# Patient Record
Sex: FEMALE | Race: WHITE | NOT HISPANIC OR LATINO | Employment: UNEMPLOYED | URBAN - NONMETROPOLITAN AREA
[De-identification: names, ages, dates, MRNs, and addresses within clinical notes are randomized per-mention and may not be internally consistent; named-entity substitution may affect disease eponyms.]

---

## 2022-09-14 PROBLEM — E66.811 OBESITY (BMI 30.0-34.9): Status: ACTIVE | Noted: 2022-09-14

## 2024-08-31 DIAGNOSIS — G47.9 SLEEP DISTURBANCE: ICD-10-CM

## 2024-08-31 DIAGNOSIS — F33.42 RECURRENT MAJOR DEPRESSIVE DISORDER, IN FULL REMISSION: ICD-10-CM

## 2024-08-31 DIAGNOSIS — Z79.899 MEDICATION MANAGEMENT: ICD-10-CM

## 2024-09-03 DIAGNOSIS — Z79.899 MEDICATION MANAGEMENT: ICD-10-CM

## 2024-09-03 DIAGNOSIS — F33.42 RECURRENT MAJOR DEPRESSIVE DISORDER, IN FULL REMISSION: ICD-10-CM

## 2024-09-03 DIAGNOSIS — G47.9 SLEEP DISTURBANCE: ICD-10-CM

## 2024-09-03 RX ORDER — QUETIAPINE FUMARATE 25 MG/1
TABLET, FILM COATED ORAL
Qty: 60 TABLET | Refills: 0 | OUTPATIENT
Start: 2024-09-03

## 2024-09-03 RX ORDER — QUETIAPINE FUMARATE 25 MG/1
50 TABLET, FILM COATED ORAL NIGHTLY
Qty: 60 TABLET | Refills: 0 | Status: SHIPPED | OUTPATIENT
Start: 2024-09-03

## 2024-09-30 DIAGNOSIS — G47.9 SLEEP DISTURBANCE: ICD-10-CM

## 2024-09-30 DIAGNOSIS — F33.42 RECURRENT MAJOR DEPRESSIVE DISORDER, IN FULL REMISSION: ICD-10-CM

## 2024-09-30 DIAGNOSIS — Z79.899 MEDICATION MANAGEMENT: ICD-10-CM

## 2024-09-30 RX ORDER — QUETIAPINE FUMARATE 25 MG/1
TABLET, FILM COATED ORAL
Qty: 60 TABLET | Refills: 0 | OUTPATIENT
Start: 2024-09-30

## 2024-10-14 ENCOUNTER — TELEMEDICINE (OUTPATIENT)
Dept: PSYCHIATRY | Facility: CLINIC | Age: 34
End: 2024-10-14
Payer: COMMERCIAL

## 2024-10-14 DIAGNOSIS — Z79.899 MEDICATION MANAGEMENT: ICD-10-CM

## 2024-10-14 DIAGNOSIS — F33.42 RECURRENT MAJOR DEPRESSIVE DISORDER, IN FULL REMISSION: Primary | ICD-10-CM

## 2024-10-14 DIAGNOSIS — G47.9 SLEEP DISTURBANCE: ICD-10-CM

## 2024-10-14 DIAGNOSIS — F41.1 GENERALIZED ANXIETY DISORDER: ICD-10-CM

## 2024-10-14 PROCEDURE — 99214 OFFICE O/P EST MOD 30 MIN: CPT

## 2024-10-14 PROCEDURE — 96127 BRIEF EMOTIONAL/BEHAV ASSMT: CPT

## 2024-10-14 RX ORDER — CLOTRIMAZOLE 1 %
CREAM (GRAM) TOPICAL
COMMUNITY
Start: 2024-09-30

## 2024-10-14 RX ORDER — QUETIAPINE FUMARATE 25 MG/1
50 TABLET, FILM COATED ORAL NIGHTLY
Qty: 60 TABLET | Refills: 0 | Status: SHIPPED | OUTPATIENT
Start: 2024-10-14

## 2024-10-14 RX ORDER — CITALOPRAM HYDROBROMIDE 10 MG/1
10 TABLET ORAL DAILY
Qty: 30 TABLET | Refills: 0 | Status: SHIPPED | OUTPATIENT
Start: 2024-10-14

## 2024-10-14 RX ORDER — HYDROXYZINE HYDROCHLORIDE 10 MG/1
10 TABLET, FILM COATED ORAL 3 TIMES DAILY PRN
Qty: 90 TABLET | Refills: 0 | Status: SHIPPED | OUTPATIENT
Start: 2024-10-14

## 2024-11-11 ENCOUNTER — TELEPHONE (OUTPATIENT)
Dept: PSYCHIATRY | Facility: CLINIC | Age: 34
End: 2024-11-11
Payer: COMMERCIAL

## 2024-11-11 NOTE — TELEPHONE ENCOUNTER
Patient received a summons to jury duty on Friday. Patient stated she has some manic episodes from time to time,very rare she stated,but is concerned that may happen while she is on the jury. Patient is aware of scheduled appointment for tomorrow 11/12/24, but ask that I send you a note to make you aware she will need to discuss this with you at that time.

## 2024-11-12 ENCOUNTER — TELEMEDICINE (OUTPATIENT)
Dept: PSYCHIATRY | Facility: CLINIC | Age: 34
End: 2024-11-12
Payer: COMMERCIAL

## 2024-11-12 DIAGNOSIS — G47.9 SLEEP DISTURBANCE: ICD-10-CM

## 2024-11-12 DIAGNOSIS — R61 EXCESSIVE SWEATING: ICD-10-CM

## 2024-11-12 DIAGNOSIS — F41.1 GENERALIZED ANXIETY DISORDER: ICD-10-CM

## 2024-11-12 DIAGNOSIS — F33.41 RECURRENT MAJOR DEPRESSIVE DISORDER, IN PARTIAL REMISSION: Primary | ICD-10-CM

## 2024-11-12 DIAGNOSIS — Z79.899 MEDICATION MANAGEMENT: ICD-10-CM

## 2024-11-12 RX ORDER — GLYCOPYRROLATE 1.5 MG/1
0.75 TABLET ORAL 2 TIMES DAILY PRN
Qty: 30 TABLET | Refills: 0 | Status: SHIPPED | OUTPATIENT
Start: 2024-11-12

## 2024-11-12 RX ORDER — HYDROXYZINE HYDROCHLORIDE 10 MG/1
10 TABLET, FILM COATED ORAL 3 TIMES DAILY PRN
Qty: 90 TABLET | Refills: 0 | Status: SHIPPED | OUTPATIENT
Start: 2024-11-12

## 2024-11-12 RX ORDER — QUETIAPINE FUMARATE 25 MG/1
50 TABLET, FILM COATED ORAL NIGHTLY
Qty: 60 TABLET | Refills: 0 | Status: SHIPPED | OUTPATIENT
Start: 2024-11-12

## 2024-11-12 RX ORDER — CITALOPRAM HYDROBROMIDE 10 MG/1
10 TABLET ORAL DAILY
Qty: 30 TABLET | Refills: 0 | Status: SHIPPED | OUTPATIENT
Start: 2024-11-12

## 2024-11-12 NOTE — LETTER
"  November 12, 2024    Nahomi Salcido  [address] [place] [address]      To Whom it May Concern,    This patient is being treated for mental health diagnoses to include anxiety, sleep disturbances, depression, and most recently we are exploring a possible mood condition with assessment of estrada. Patient recently informed this provider she has been summoned to jury duty, which likely is currently contributing to symptoms of anxiety. Patient is concerned of possible paranoia/anxiety/inability to focus upon information being provided to her during the trial, concerned she will not be able to utilize \"sound judgement\" in such circumstances and therefore I am recommending an excuse from jury duty based upon current mental health symptoms. We are working through medication changes today related to this, uncertain of how she will respond. If you have any questions, please feel free to reach out to my office.                        Estela Bernard, LISY-PMHNP  "

## 2024-11-12 NOTE — TREATMENT PLAN
Multi-Disciplinary Problems (from Behavioral Health Treatment Plan)      Active Problems       Problem: Anxiety  Start Date: 11/12/24      Problem Details: The patient self-scales this problem as a 1 with 10 being the worst.          Goal Priority Start Date Expected End Date End Date    Patient will develop and implement behavioral and cognitive strategies to reduce anxiety and irrational fears. High 11/12/24 05/13/25 --    Goal Details: Progress toward goal:  The patient self-scales their progress related to this goal as a 2 with 10 being the worst.          Goal Intervention Frequency Start Date End Date    Help patient explore past emotional issues in relation to present anxiety. PRN 11/12/24 --    Intervention Details: Duration of treatment until discharged.          Goal Intervention Frequency Start Date End Date    Help patient develop an awareness of their cognitive and physical responses to anxiety. PRN 11/12/24 --    Intervention Details: Duration of treatment until discharged.                  Problem: Depression  Start Date: 11/12/24      Problem Details: The patient self-scales this problem as a 1 with 10 being the worst.          Goal Priority Start Date Expected End Date End Date    Patient will demonstrate the ability to initiate new constructive life skills outside of sessions on a consistent basis. High 11/12/24 05/13/25 --    Goal Details: Progress toward goal:  The patient self-scales their progress related to this goal as a 1 with 10 being the worst.          Goal Intervention Frequency Start Date End Date    Assist patient in setting attainable activities of daily living goals. PRN 11/12/24 --      Goal Intervention Frequency Start Date End Date    Provide education about depression PRN 11/12/24 --    Intervention Details: Duration of treatment until discharged.          Goal Intervention Frequency Start Date End Date    Assist patient in developing healthy coping strategies. PRN 11/12/24 --     Intervention Details: Duration of treatment until discharged.                                 I have discussed and reviewed this treatment plan with the patient and/or guardian.  The patient has verbally agreed with this treatment plan (no signatures are obtained at today's visit as the patient is a telehealth patient and is unable to print and sign this document, therefore verbal agreement is obtained).

## 2024-11-12 NOTE — PROGRESS NOTES
"This provider is located at the [facility] (through [facility]), [address], [facility], [address] using a secure video visit through [employer]. Patient is being seen remotely via telehealth at their home address in [place], and stated they are in a secure environment for this session.   The patient's condition being consulted/diagnosed/treated is appropriate for telemedicine. The provider identified herself as well as her credentials.   The patient, and/or patients guardian, consent to be seen remotely, and when consent is given they understand that the consent allows for patient identifiable information to be sent to a third party as needed. They may refuse to be seen remotely at any time. The electronic data is encrypted and password protected, and the patient and/or guardian has been advised of the potential risks to privacy not withstanding such measures.   The use of a video visit has been reviewed with the patient and verbal informed consent has been obtained.   Patient identifiers used: Name and .    Subjective   Nahomi Salcido is a 34 y.o. female who presents today for follow up    Chief Complaint:    Chief Complaint   Patient presents with   • Anxiety   • Depression   • Med Management   • Sleeping Problem   • Irritable         Mode of Visit: Video  Location of patient: -HOME-  Location of provider: +HOME+  You have chosen to receive care through a telehealth visit.  The patient has signed the video visit consent form.  The visit included audio and video interaction. No technical issues occurred during this visit.        History of Present Illness:   History of Present Illness  Patient is a 34-year-old female presenting for a follow-up related to irritability, sleep disturbance, anxiety and depression.  Indicates over the past month she has been doing well, unfortunately continues to experience some excessive sweating with use of Celexa.  She states \"real bad " "heat sensitivity.\"  Regarding efficacy \"it does help.\"  She is hesitant to change this medication at this point due to efficacy noted.  PHQ increased from 0-5, indicating mild depression which patient states \"I am not really depressed at all\" and rates this condition a 1/10.  MOLLY increase from 0-5, indicating mild anxiety which patient rates a 5/10 on average.  Utilizes Atarax intermittently, has not needed to use as much lately but voices efficacy in addressing anxiety when used.  Experiencing a lot of anxiety due to recently being summoned for jury duty \"gives me horrible anxiety.\"  She also questions estrada \"I do not go manic but when I do there is a personality difference.\"  When asking to elaborate, patient cites some impulsivity's, mostly related to social situations and excessive planning.  She does have moments of only sleeping approximately 4 hours and waking up feeling fine.  Denies symptoms consistent with EPS or TD K.  Aims scoring performed and scored 0.  Seroquel has been somewhat beneficial in addressing the circumstances, cites 6 to 7 hours of sleep nightly on average \"I love the Seroquel, it helps with my mood and irritability.\"  She denies SI, HI, SIB, or hallucinations currently and is convincing.  Appetite \"fluctuates, it is ravenous before my cycle.\"  Regarding jury duty, patient states she is concerned her mind will be elsewhere, she will not be able to use sound judgment in making a decision.  Discussed possibly changing Celexa however patient states \"I would like to try the medication for sweating before we switch.\"  Previously prescribed glycopyrrolate which she since has forgotten she has at home.  Denies medical status changes.    Patient resides in her home with her spouse and her son and daughter.  She is a stay-at-home mom.  She states her mom lives close by but they do not socialize a lot.  Brother recently baptized and practicing sobriety.  Little communication with father as well.  " "She has recently cut ties with him based upon his behavior, excessive alcohol consumption, and she does continue to stress over his current medical status.  She cites some circumstances of trauma previously \"my brother's friends would take advantage\" regarding relationships growing up.  Denies previous or current drug use, drinks alcohol socially.  Daughter currently having difficulty in school, not wanting to go which is contributing to patient's stress. Also recently going through insurance changes. [facility].  Brother getting  later this month.       Last Menstrual Period:  Ovulated 2 weeks ago-IUD-last weekend    The patient denies any chance of pregnancy at this time.  The patient was educated that her prescribed medications can have potential risk to a developing fetus. The patient is advised to contact this APRN/this office if she becomes pregnant or plans to become pregnant.  Pt verbalizes understanding and acknowledged agreement with this plan in her own words.    The following portions of the patient's history were reviewed and updated as appropriate: allergies, current medications, past family history, past medical history, past social history, past surgical history and problem list.    Past Psychiatric History:  Began Treatment: age 24  Diagnoses:Depression and Anxiety  Psychiatrist:Denies  Therapist: several years ago  Admission History:Denies  Medication Trials: cymbalta, buspar, effexor, vraylar, fluoxetine, atarax, remeron (made worse), lamictal, clonidine .01 (half tab felt dizzy and faint), lexapro (10 mg excessive sweating)  Self Harm: Denies  Suicide Attempts:Denies   Psychosis, Anxiety, Depression:  PPD    Past Medical History:  Past Medical History:   Diagnosis Date   • Anxiety    • Depression    • Kidney stone    • Ovarian cyst    • Urinary tract infection        Substance Abuse History:   Types: alcohol  Withdrawal Symptoms:Denies  Longest Period " Sober:Not Applicable   AA: No    Social History:  Social History     Socioeconomic History   • Marital status:    • Number of children: 2   • Highest education level: GED or equivalent   Tobacco Use   • Smoking status: Former     Current packs/day: 0.00     Average packs/day: 0.7 packs/day for 22.0 years (15.0 ttl pk-yrs)     Types: Cigarettes     Start date: 8/2/2007     Quit date: 8/2/2021     Years since quitting: 3.2   • Smokeless tobacco: Never   Vaping Use   • Vaping status: Former   Substance and Sexual Activity   • Alcohol use: Yes     Alcohol/week: 10.0 standard drinks of alcohol     Types: 10 Standard drinks or equivalent per week     Comment: on the weekends   • Drug use: Never   • Sexual activity: Yes     Partners: Male     Birth control/protection: I.U.D.     Comment: With fiance, IUD 10/2017       Family History:  Family History   Problem Relation Age of Onset   • No Known Problems Mother    • Colon cancer Father 63   • Alcohol abuse Father    • Drug abuse Brother    • Arthritis Maternal Grandmother    • Cancer Maternal Grandmother    • Thyroid disease Maternal Grandmother    • Lung cancer Maternal Grandfather    • Cancer Maternal Grandfather    • No Known Problems Paternal Grandmother         unknown cause of death   • Dementia Paternal Grandfather    • No Known Problems Daughter    • No Known Problems Son        Past Surgical History:  Past Surgical History:   Procedure Laterality Date   • WISDOM TOOTH EXTRACTION  2013       Problem List:  Patient Active Problem List   Diagnosis   • Acne   • Mixed anxiety depressive disorder   • Vitamin D deficiency   • Tobacco abuse   • Herpes simplex vulvovaginitis   • Well adult exam   • Chronic pain of left wrist   • Soft tissue injury   • Periodic headache syndrome, not intractable   • Obesity (BMI 30.0-34.9)   • Allergy to poison ivy   • Anxiety   • Bipolar depression   • Candidal dermatitis   • Toe injury, right, initial encounter   • Normal gynecologic  examination   • Irritable bowel syndrome with diarrhea       Allergy:   Allergies   Allergen Reactions   • Penicillins Hives   • Penicillin G Rash        Current Medications:   Current Outpatient Medications   Medication Sig Dispense Refill   • citalopram (CeleXA) 10 MG tablet Take 1 tablet by mouth Daily. 30 tablet 0   • hydrOXYzine (ATARAX) 10 MG tablet Take 1 tablet by mouth 3 (Three) Times a Day As Needed (anxiety and/or sleep). 90 tablet 0   • QUEtiapine (SEROquel) 25 MG tablet Take 2 tablets by mouth Every Night. 60 tablet 0   • Cholecalciferol 25 MCG (1000 UT) tablet Take 1 tablet by mouth Daily.     • clotrimazole (LOTRIMIN) 1 % cream APPLY TOPICALLY TO THE AFFECTED AREA UNDER BREAST TWICE DAILY UNTIL GONE AS NEEDED FOR FLARES     • Glycopyrrolate 1.5 MG tablet Take 0.5 tablets by mouth 2 (Two) Times a Day As Needed (sweating). 30 tablet 0   • ketoconazole (NIZORAL) 2 % cream      • Levonorgestrel (MIRENA) 20 MCG/DAY intrauterine device IUD 1 each by Intrauterine route Every 8 (Eight) Years.     • Retin-A 0.025 % cream      • triamcinolone (KENALOG) 0.5 % cream 1 Application Every 12 (Twelve) Hours.     • valACYclovir (VALTREX) 1000 MG tablet Take 1 tablet by mouth Daily. 30 tablet 11     No current facility-administered medications for this visit.       Review of Systems:    Review of Systems   Constitutional: Negative.    HENT: Negative.     Eyes: Negative.    Respiratory: Negative.     Cardiovascular: Negative.    Gastrointestinal: Negative.    Endocrine: Negative.    Genitourinary: Negative.    Musculoskeletal: Negative.    Skin:  Positive for rash.   Allergic/Immunologic: Negative.    Neurological:  Positive for memory problem.   Hematological: Negative.    Psychiatric/Behavioral:  Positive for sleep disturbance and stress. The patient is nervous/anxious.          Physical Exam:   Physical Exam  Constitutional:       Appearance: Normal appearance. She is normal weight.   HENT:      Head: Normocephalic.       Nose: Nose normal.   Pulmonary:      Effort: Pulmonary effort is normal.   Musculoskeletal:         General: Normal range of motion.      Cervical back: Normal range of motion.   Neurological:      General: No focal deficit present.      Mental Status: She is alert. Mental status is at baseline.   Psychiatric:         Attention and Perception: Attention and perception normal.         Mood and Affect: Affect normal. Mood is anxious.         Speech: Speech normal.         Behavior: Behavior normal. Behavior is cooperative.         Thought Content: Thought content normal.         Cognition and Memory: Cognition and memory normal.         Judgment: Judgment normal.       Vitals:  not currently breastfeeding. There is no height or weight on file to calculate BMI.  Due to extenuating circumstances and possible current health risks associated with the patient being present in a clinical setting (with current health restrictions in place in regards to possible COVID 19 transmission/exposure), the patient was seen remotely today via a MyChart Video Visit through [employer].  Unable to obtain vital signs due to nature of remote visit.  Height stated at 5ft5 inches.  Weight stated at 180 pounds.    Last 3 Blood Pressure Readings:  BP Readings from Last 3 Encounters:   08/22/24 118/86   02/21/24 106/72   10/12/23 100/70     PHQ-9 Depression Screening  Little interest or pleasure in doing things? (Patient-Rptd) Over half   Feeling down, depressed, or hopeless? (Patient-Rptd) Not at all   PHQ-2 Total Score (Patient-Rptd) 2   Trouble falling or staying asleep, or sleeping too much? (Patient-Rptd) Not at all   Feeling tired or having little energy? (Patient-Rptd) Several days   Poor appetite or overeating? (Patient-Rptd) Not at all   Feeling bad about yourself - or that you are a failure or have let yourself or your family down? (Patient-Rptd) Over half   Trouble concentrating on things, such as reading the newspaper or watching  television? (Patient-Rptd) Not at all   Moving or speaking so slowly that other people could have noticed? Or the opposite - being so fidgety or restless that you have been moving around a lot more than usual? (Patient-Rptd) Not at all   Thoughts that you would be better off dead, or of hurting yourself in some way? (Patient-Rptd) Not at all   PHQ-9 Total Score (Patient-Rptd) 5   If you checked off any problems, how difficult have these problems made it for you to do your work, take care of things at home, or get along with other people? (Patient-Rptd) Somewhat difficult           MOLLY-7 Score:   Feeling nervous, anxious or on edge: (Patient-Rptd) Several days  Not being able to stop or control worrying: (Patient-Rptd) Not at all  Worrying too much about different things: (Patient-Rptd) Several days  Trouble Relaxing: (Patient-Rptd) Several days  Being so restless that it is hard to sit still: (Patient-Rptd) Not at all  Feeling afraid as if something awful might happen: (Patient-Rptd) Several days  Becoming easily annoyed or irritable: (Patient-Rptd) Several days  MOLLY 7 Total Score: (Patient-Rptd) 5  If you checked any problems, how difficult have these problems made it for you to do your work, take care of things at home, or get along with other people: (Patient-Rptd) Somewhat difficult     Mental Status Exam:   Hygiene:   good  Cooperation:  Cooperative  Eye Contact:  Good  Psychomotor Behavior:  Appropriate  Affect:   congruent with mood   Mood: anxious  Hopelessness: Denies  Speech:  Normal  Thought Process:  Goal directed  Thought Content:  Normal  Suicidal:  None  Homicidal:  None  Hallucinations:  None  Delusion:  None  Memory:  Intact  Orientation:  Person, Place, Time and Situation  Reliability:  good  Insight:  Fair  Judgement:  Good  Impulse Control:  Good  Physical/Medical Issues:  No        Lab Results:   Lab on 07/03/2024   Component Date Value Ref Range Status   • C-Reactive Protein 07/03/2024 <0.30   0.00 - 0.50 mg/dL Final   • Gliadin Deamidated Peptide Ab, IgA 07/03/2024 3  0 - 19 units Final                       Negative                   0 - 19                     Weak Positive             20 - 30                     Moderate to Strong Positive   >30   • Deaminated Gliadin Ab IgG 07/03/2024 2  0 - 19 units Final                       Negative                   0 - 19                     Weak Positive             20 - 30                     Moderate to Strong Positive   >30   • Tissue Transglutaminase IgA 07/03/2024 <2  0 - 3 U/mL Final                                  Negative        0 -  3                                Weak Positive   4 - 10                                Positive           >10   Tissue Transglutaminase (tTG) has been identified   as the endomysial antigen.  Studies have demonstr-   ated that endomysial IgA antibodies have over 99%   specificity for gluten sensitive enteropathy.   • Tissue Transglutaminase IgG 07/03/2024 <2  0 - 5 U/mL Final                                  Negative        0 - 5                                Weak Positive   6 - 9                                Positive           >9   • Endomysial IgA 07/03/2024 Negative  Negative Final   • IgA 07/03/2024 95  87 - 352 mg/dL Final   • WBC 07/03/2024 5.92  3.40 - 10.80 10*3/mm3 Final   • RBC 07/03/2024 4.55  3.77 - 5.28 10*6/mm3 Final   • Hemoglobin 07/03/2024 13.8  12.0 - 15.9 g/dL Final   • Hematocrit 07/03/2024 41.0  34.0 - 46.6 % Final   • MCV 07/03/2024 90.1  79.0 - 97.0 fL Final   • MCH 07/03/2024 30.3  26.6 - 33.0 pg Final   • MCHC 07/03/2024 33.7  31.5 - 35.7 g/dL Final   • RDW 07/03/2024 12.2 (L)  12.3 - 15.4 % Final   • RDW-SD 07/03/2024 39.7  37.0 - 54.0 fl Final   • MPV 07/03/2024 10.0  6.0 - 12.0 fL Final   • Platelets 07/03/2024 316  140 - 450 10*3/mm3 Final   • Neutrophil % 07/03/2024 61.5  42.7 - 76.0 % Final   • Lymphocyte % 07/03/2024 29.7  19.6 - 45.3 % Final   • Monocyte % 07/03/2024 5.9  5.0 - 12.0 %  Final   • Eosinophil % 07/03/2024 2.0  0.3 - 6.2 % Final   • Basophil % 07/03/2024 0.7  0.0 - 1.5 % Final   • Immature Grans % 07/03/2024 0.2  0.0 - 0.5 % Final   • Neutrophils, Absolute 07/03/2024 3.64  1.70 - 7.00 10*3/mm3 Final   • Lymphocytes, Absolute 07/03/2024 1.76  0.70 - 3.10 10*3/mm3 Final   • Monocytes, Absolute 07/03/2024 0.35  0.10 - 0.90 10*3/mm3 Final   • Eosinophils, Absolute 07/03/2024 0.12  0.00 - 0.40 10*3/mm3 Final   • Basophils, Absolute 07/03/2024 0.04  0.00 - 0.20 10*3/mm3 Final   • Immature Grans, Absolute 07/03/2024 0.01  0.00 - 0.05 10*3/mm3 Final   • nRBC 07/03/2024 0.0  0.0 - 0.2 /100 WBC Final   Office Visit on 07/03/2024   Component Date Value Ref Range Status   • Glucose 07/03/2024 87  65 - 99 mg/dL Final   • BUN 07/03/2024 10  6 - 20 mg/dL Final   • Creatinine 07/03/2024 0.87  0.57 - 1.00 mg/dL Final   • Sodium 07/03/2024 139  136 - 145 mmol/L Final   • Potassium 07/03/2024 4.5  3.5 - 5.2 mmol/L Final   • Chloride 07/03/2024 102  98 - 107 mmol/L Final   • CO2 07/03/2024 27.0  22.0 - 29.0 mmol/L Final   • Calcium 07/03/2024 9.7  8.6 - 10.5 mg/dL Final   • Total Protein 07/03/2024 6.9  6.0 - 8.5 g/dL Final   • Albumin 07/03/2024 4.5  3.5 - 5.2 g/dL Final   • ALT (SGPT) 07/03/2024 44 (H)  1 - 33 U/L Final   • AST (SGOT) 07/03/2024 29  1 - 32 U/L Final   • Alkaline Phosphatase 07/03/2024 50  39 - 117 U/L Final   • Total Bilirubin 07/03/2024 0.2  0.0 - 1.2 mg/dL Final   • Globulin 07/03/2024 2.4  gm/dL Final   • A/G Ratio 07/03/2024 1.9  g/dL Final   • BUN/Creatinine Ratio 07/03/2024 11.5  7.0 - 25.0 Final   • Anion Gap 07/03/2024 10.0  5.0 - 15.0 mmol/L Final   • eGFR 07/03/2024 90.3  >60.0 mL/min/1.73 Final   • Magnesium 07/03/2024 2.0  1.6 - 2.6 mg/dL Final   • TSH 07/03/2024 1.020  0.270 - 4.200 uIU/mL Final   • 25 Hydroxy, Vitamin D 07/03/2024 29.6 (L)  30.0 - 100.0 ng/ml Final         Assessment & Plan   Problems Addressed this Visit    None  Visit Diagnoses       Recurrent major  depressive disorder, in partial remission    -  Primary    Relevant Medications    citalopram (CeleXA) 10 MG tablet    hydrOXYzine (ATARAX) 10 MG tablet    QUEtiapine (SEROquel) 25 MG tablet    Generalized anxiety disorder        Relevant Medications    citalopram (CeleXA) 10 MG tablet    hydrOXYzine (ATARAX) 10 MG tablet    QUEtiapine (SEROquel) 25 MG tablet    Sleep disturbance        Relevant Medications    QUEtiapine (SEROquel) 25 MG tablet    Excessive sweating        Relevant Medications    Glycopyrrolate 1.5 MG tablet    Medication management        Relevant Medications    citalopram (CeleXA) 10 MG tablet    hydrOXYzine (ATARAX) 10 MG tablet    QUEtiapine (SEROquel) 25 MG tablet    Glycopyrrolate 1.5 MG tablet          Diagnoses         Codes Comments    Recurrent major depressive disorder, in partial remission    -  Primary ICD-10-CM: F33.41  ICD-9-CM: 296.35     Generalized anxiety disorder     ICD-10-CM: F41.1  ICD-9-CM: 300.02     Sleep disturbance     ICD-10-CM: G47.9  ICD-9-CM: 780.50     Excessive sweating     ICD-10-CM: R61  ICD-9-CM: 780.8     Medication management     ICD-10-CM: Z79.899  ICD-9-CM: V58.69             Visit Diagnoses:    ICD-10-CM ICD-9-CM   1. Recurrent major depressive disorder, in partial remission  F33.41 296.35   2. Generalized anxiety disorder  F41.1 300.02   3. Sleep disturbance  G47.9 780.50   4. Excessive sweating  R61 780.8   5. Medication management  Z79.899 V58.69     Seroquel, Celexa and Atarax refilled.  Glycopyrrolate 1.5 mg reinstated, instructed patient to take 1/2 tablet twice daily due to excessive sweating as needed.  Previously educated upon side effects with use of these medications as well as when to present for emergency services, denies at this time.  Follow up in 4 weeks or sooner if needed.  Excuse for jury duty provided today.    GOALS:  Short Term Goals: Patient will be compliant with medication, and patient will have no significant medication related side  effects.  Patient will be engaged in psychotherapy as indicated.  Patient will report subjective improvement of symptoms.  Long term goals: To stabilize mood and treat/improve subjective symptoms, the patient will stay out of the hospital, the patient will be at an optimal level of functioning, and the patient will take all medications as prescribed.  The patient/guardian verbalized understanding and agreement with goals that were mutually set.      TREATMENT PLAN: Continue supportive psychotherapy efforts and medications as indicated.  Pharmacological and Non-Pharmacological treatment options discussed during today's visit. Patient/Guardian acknowledged and verbally consented with current treatment plan and was educated on the importance of compliance with treatment and follow-up appointments.      MEDICATION ISSUES:  Discussed medication options and treatment plan of prescribed medication as well as the risks, benefits, any black box warnings, and side effects including potential falls, possible impaired driving, and metabolic adversities among others. Patient is agreeable to call the office with any worsening of symptoms or onset of side effects, or if any concerns or questions arise.  The contact information for the office is made available to the patient. Patient is agreeable to call 911 or go to the nearest ER should they begin having any SI/HI, or if any urgent concerns arise. No medication side effects or related complaints today.     MEDS ORDERED DURING VISIT:  New Medications Ordered This Visit   Medications   • citalopram (CeleXA) 10 MG tablet     Sig: Take 1 tablet by mouth Daily.     Dispense:  30 tablet     Refill:  0   • hydrOXYzine (ATARAX) 10 MG tablet     Sig: Take 1 tablet by mouth 3 (Three) Times a Day As Needed (anxiety and/or sleep).     Dispense:  90 tablet     Refill:  0   • QUEtiapine (SEROquel) 25 MG tablet     Sig: Take 2 tablets by mouth Every Night.     Dispense:  60 tablet     Refill:   0   • Glycopyrrolate 1.5 MG tablet     Sig: Take 0.5 tablets by mouth 2 (Two) Times a Day As Needed (sweating).     Dispense:  30 tablet     Refill:  0       Follow Up Appointment:   Return in about 4 weeks (around 12/10/2024) for Recheck.             This document has been electronically signed by LISY Hood  November 12, 2024 12:32 EST    Some of the data in this electronic note has been brought forward from a previous encounter, any necessary changes have been made, it has been reviewed by this APRN, and it is accurate.    Dictated Utilizing Dragon Dictation: Part of this note may be an electronic transcription/translation of spoken language to printed text using the Dragon Dictation System.

## 2024-12-17 DIAGNOSIS — Z79.899 MEDICATION MANAGEMENT: ICD-10-CM

## 2024-12-17 DIAGNOSIS — F41.1 GENERALIZED ANXIETY DISORDER: ICD-10-CM

## 2024-12-17 DIAGNOSIS — G47.9 SLEEP DISTURBANCE: ICD-10-CM

## 2024-12-17 DIAGNOSIS — F33.41 RECURRENT MAJOR DEPRESSIVE DISORDER, IN PARTIAL REMISSION: ICD-10-CM

## 2024-12-17 RX ORDER — CITALOPRAM HYDROBROMIDE 10 MG/1
10 TABLET ORAL DAILY
Qty: 30 TABLET | Refills: 0 | Status: SHIPPED | OUTPATIENT
Start: 2024-12-17

## 2024-12-17 RX ORDER — QUETIAPINE FUMARATE 25 MG/1
50 TABLET, FILM COATED ORAL NIGHTLY
Qty: 60 TABLET | Refills: 0 | Status: SHIPPED | OUTPATIENT
Start: 2024-12-17

## 2024-12-17 NOTE — TELEPHONE ENCOUNTER
Pt called requested appointment with provider before first year patients insurance is not going to be in network I advised patient to call the insurance to check.Pt request Celexa 10 mg, Seroquel 25 mg medication sent to   [id number] - [place], [place] - [address] AT [facility] [address] - [phone] [facility] [phone]  [address], [facility] [address]   For some reason it will not let me attached pharmacy.Pt stated she was doing well on the medication.

## 2024-12-17 NOTE — TELEPHONE ENCOUNTER
Called patient the [facility] that was sent in message is now closed.I have the correct one attached

## 2025-01-23 DIAGNOSIS — G47.9 SLEEP DISTURBANCE: ICD-10-CM

## 2025-01-23 DIAGNOSIS — Z79.899 MEDICATION MANAGEMENT: ICD-10-CM

## 2025-01-23 DIAGNOSIS — F33.41 RECURRENT MAJOR DEPRESSIVE DISORDER, IN PARTIAL REMISSION: ICD-10-CM

## 2025-01-23 DIAGNOSIS — F41.1 GENERALIZED ANXIETY DISORDER: ICD-10-CM

## 2025-01-23 RX ORDER — CITALOPRAM HYDROBROMIDE 10 MG/1
10 TABLET ORAL DAILY
Qty: 7 TABLET | Refills: 0 | Status: SHIPPED | OUTPATIENT
Start: 2025-01-23

## 2025-01-23 RX ORDER — QUETIAPINE FUMARATE 25 MG/1
50 TABLET, FILM COATED ORAL NIGHTLY
Qty: 60 TABLET | Refills: 0 | OUTPATIENT
Start: 2025-01-23

## 2025-01-23 RX ORDER — CITALOPRAM HYDROBROMIDE 10 MG/1
10 TABLET ORAL DAILY
Qty: 30 TABLET | Refills: 0 | OUTPATIENT
Start: 2025-01-23

## 2025-01-23 RX ORDER — QUETIAPINE FUMARATE 25 MG/1
50 TABLET, FILM COATED ORAL NIGHTLY
Qty: 14 TABLET | Refills: 0 | Status: SHIPPED | OUTPATIENT
Start: 2025-01-23

## 2025-01-27 DIAGNOSIS — Z79.899 MEDICATION MANAGEMENT: ICD-10-CM

## 2025-01-27 DIAGNOSIS — G47.9 SLEEP DISTURBANCE: ICD-10-CM

## 2025-01-27 DIAGNOSIS — F33.41 RECURRENT MAJOR DEPRESSIVE DISORDER, IN PARTIAL REMISSION: ICD-10-CM

## 2025-01-27 DIAGNOSIS — F41.1 GENERALIZED ANXIETY DISORDER: ICD-10-CM

## 2025-01-31 DIAGNOSIS — F41.1 GENERALIZED ANXIETY DISORDER: ICD-10-CM

## 2025-01-31 DIAGNOSIS — F33.41 RECURRENT MAJOR DEPRESSIVE DISORDER, IN PARTIAL REMISSION: ICD-10-CM

## 2025-01-31 DIAGNOSIS — Z79.899 MEDICATION MANAGEMENT: ICD-10-CM

## 2025-01-31 DIAGNOSIS — G47.9 SLEEP DISTURBANCE: ICD-10-CM

## 2025-02-04 DIAGNOSIS — G47.9 SLEEP DISTURBANCE: ICD-10-CM

## 2025-02-04 DIAGNOSIS — F33.41 RECURRENT MAJOR DEPRESSIVE DISORDER, IN PARTIAL REMISSION: ICD-10-CM

## 2025-02-04 DIAGNOSIS — Z79.899 MEDICATION MANAGEMENT: ICD-10-CM

## 2025-02-04 DIAGNOSIS — F41.1 GENERALIZED ANXIETY DISORDER: ICD-10-CM

## 2025-02-04 RX ORDER — QUETIAPINE FUMARATE 25 MG/1
50 TABLET, FILM COATED ORAL NIGHTLY
Qty: 14 TABLET | Refills: 0 | OUTPATIENT
Start: 2025-02-04

## 2025-02-04 RX ORDER — CITALOPRAM HYDROBROMIDE 10 MG/1
10 TABLET ORAL DAILY
Qty: 7 TABLET | Refills: 0 | OUTPATIENT
Start: 2025-02-04

## 2025-02-10 DIAGNOSIS — F33.41 RECURRENT MAJOR DEPRESSIVE DISORDER, IN PARTIAL REMISSION: ICD-10-CM

## 2025-02-10 DIAGNOSIS — F41.1 GENERALIZED ANXIETY DISORDER: ICD-10-CM

## 2025-02-10 DIAGNOSIS — G47.9 SLEEP DISTURBANCE: ICD-10-CM

## 2025-02-10 DIAGNOSIS — Z79.899 MEDICATION MANAGEMENT: ICD-10-CM

## 2025-02-10 RX ORDER — QUETIAPINE FUMARATE 25 MG/1
50 TABLET, FILM COATED ORAL NIGHTLY
Qty: 180 TABLET | Refills: 1 | Status: SHIPPED | OUTPATIENT
Start: 2025-02-10

## 2025-02-10 RX ORDER — QUETIAPINE FUMARATE 25 MG/1
50 TABLET, FILM COATED ORAL NIGHTLY
Qty: 14 TABLET | Refills: 0 | OUTPATIENT
Start: 2025-02-10

## 2025-02-10 RX ORDER — CITALOPRAM HYDROBROMIDE 10 MG/1
10 TABLET ORAL DAILY
Qty: 7 TABLET | Refills: 0 | OUTPATIENT
Start: 2025-02-10

## 2025-02-10 RX ORDER — CITALOPRAM HYDROBROMIDE 10 MG/1
10 TABLET ORAL DAILY
Qty: 90 TABLET | Refills: 1 | Status: SHIPPED | OUTPATIENT
Start: 2025-02-10

## 2025-03-11 ENCOUNTER — OFFICE VISIT (OUTPATIENT)
Dept: FAMILY MEDICINE CLINIC | Facility: CLINIC | Age: 35
End: 2025-03-11
Payer: COMMERCIAL

## 2025-03-11 VITALS
WEIGHT: 195.4 LBS | HEART RATE: 90 BPM | OXYGEN SATURATION: 99 % | SYSTOLIC BLOOD PRESSURE: 112 MMHG | DIASTOLIC BLOOD PRESSURE: 66 MMHG | TEMPERATURE: 98.7 F | HEIGHT: 65 IN | BODY MASS INDEX: 32.55 KG/M2

## 2025-03-11 DIAGNOSIS — E55.9 VITAMIN D DEFICIENCY: ICD-10-CM

## 2025-03-11 DIAGNOSIS — E66.811 OBESITY (BMI 30.0-34.9): ICD-10-CM

## 2025-03-11 DIAGNOSIS — Z00.00 WELL ADULT EXAM: Primary | ICD-10-CM

## 2025-03-11 DIAGNOSIS — Z01.419 WELL WOMAN EXAM WITH ROUTINE GYNECOLOGICAL EXAM: ICD-10-CM

## 2025-03-11 NOTE — ASSESSMENT & PLAN NOTE
Patient's (Body mass index is 32.52 kg/m².) indicates that they are obese (BMI >30) with health conditions that include none . Weight is worsening. BMI  is above average; BMI management plan is completed. We discussed low calorie, low carb based diet program, portion control, and increasing exercise.

## 2025-03-11 NOTE — PATIENT INSTRUCTIONS

## 2025-03-11 NOTE — PROGRESS NOTES
Nahomi Salcido is a 34 y.o. female who presents today for a well woman exam.    Chief Complaint   Patient presents with    Sexual Problem    Annual Exam        HPI     Last pap smear 2 years ago, results were normal. No history of abnormal pap smears. No family history of cervical, ovarian, breast, or uterine cancer.     Sexually active with one male partner. No vaginal discharge, itching, dysuria, or pelvic pain. Not interested in screening for STIs.     She sees the dentist twice a year. She sees the optometrist once a year. She sees dermatologist periodically.     Diet has been poor lately but plans to get back into healthy eating.     Physical activity includes nothing currently but plans to start going into the gym.     No sleep problems with seroquel. Average hours per night 6-7.     Depression and anxiety currently well controlled.       1/24/2025     8:47 AM   PHQ-2/PHQ-9 Depression Screening   Little interest or pleasure in doing things Several days   Feeling down, depressed, or hopeless Several days   Trouble falling or staying asleep, or sleeping too much Not at all   Feeling tired or having little energy Several days   Poor appetite or overeating Several days   Feeling bad about yourself - or that you are a failure or have let yourself or your family down Several days   Trouble concentrating on things, such as reading the newspaper or watching television Not at all   Moving or speaking so slowly that other people could have noticed? Or the opposite - being so fidgety or restless that you have been moving around a lot more than usual. Not at all   Thoughts that you would be better off dead or hurting yourself in some way Not at all   Patient Health Questionnaire-9 Score 5    How difficult have these problems made it for you to do your work, take care of things at home, or get along with other people? Somewhat difficult       Patient-reported       Review of Systems   Constitutional:  Negative for fever  and unexpected weight loss.   HENT:  Negative for congestion, ear pain and sore throat.    Eyes:  Negative for visual disturbance.   Respiratory:  Negative for cough, shortness of breath and wheezing.    Cardiovascular:  Negative for chest pain and palpitations.   Gastrointestinal:  Negative for abdominal pain, blood in stool, constipation, diarrhea, nausea, vomiting and GERD.   Endocrine: Negative for polydipsia and polyuria.   Genitourinary:  Negative for difficulty urinating.   Musculoskeletal:  Negative for joint swelling.   Skin:  Negative for rash and skin lesions.   Allergic/Immunologic: Negative for environmental allergies.   Neurological:  Negative for seizures and syncope.   Hematological:  Does not bruise/bleed easily.   Psychiatric/Behavioral:  Negative for suicidal ideas.         Health Maintenance   Topic Date Due    ANNUAL PHYSICAL  2024    COVID-19 Vaccine (3 - 2024- season) 2025 (Originally 2024)    INFLUENZA VACCINE  2025 (Originally 2024)    BMI FOLLOWUP  2026    PAP SMEAR  2026    TDAP/TD VACCINES (2 - Td or Tdap) 2031    HEPATITIS C SCREENING  Completed    Pneumococcal Vaccine 0-49  Aged Out       Obstetric History:  OB History          3    Para   2    Term   2       0    AB   1    Living   2         SAB   1    IAB   0    Ectopic   0    Molar        Multiple   0    Live Births   2               Menstrual History:     No LMP recorded. Patient has had an implant.         Past Medical History:   Diagnosis Date    Anxiety     Bipolar disorder 3 months or so ago    Depression     Herpes     Fiancé has oral cold sores. I believe that’s how in contacted it.    Kidney stone     Ovarian cyst     Ulcer     At an ER visit i was diagnosed with ulcers    Urinary tract infection         Past Surgical History:   Procedure Laterality Date    WISDOM TOOTH EXTRACTION          Family History   Problem Relation Age of Onset    No Known  Problems Mother     Colon cancer Father 63    Alcohol abuse Father     Suicide Attempts Father     Depression Father     Drug abuse Brother     Drug abuse Brother     Drug abuse Brother     Arthritis Maternal Grandmother     Cancer Maternal Grandmother     Thyroid disease Maternal Grandmother     Dementia Maternal Grandmother     Lung cancer Maternal Grandfather     Cancer Maternal Grandfather     No Known Problems Paternal Grandmother         unknown cause of death    Dementia Paternal Grandfather     No Known Problems Daughter     No Known Problems Son         Social History     Socioeconomic History    Marital status:     Number of children: 2    Highest education level: GED or equivalent   Tobacco Use    Smoking status: Former     Current packs/day: 0.00     Average packs/day: 0.7 packs/day for 22.0 years (15.0 ttl pk-yrs)     Types: Cigarettes     Start date: 8/2/2007     Quit date: 8/2/2021     Years since quitting: 3.6    Smokeless tobacco: Never   Vaping Use    Vaping status: Former   Substance and Sexual Activity    Alcohol use: Yes     Alcohol/week: 10.0 standard drinks of alcohol     Types: 10 Standard drinks or equivalent per week     Comment: on the weekends    Drug use: Never    Sexual activity: Yes     Partners: Male     Birth control/protection: I.U.D.     Comment: With fiance, IUD 10/2017        Current Outpatient Medications on File Prior to Visit   Medication Sig Dispense Refill    Cholecalciferol 25 MCG (1000 UT) tablet Take 1 tablet by mouth Daily.      citalopram (CeleXA) 10 MG tablet Take 1 tablet by mouth Daily. 90 tablet 1    clotrimazole (LOTRIMIN) 1 % cream APPLY TOPICALLY TO THE AFFECTED AREA UNDER BREAST TWICE DAILY UNTIL GONE AS NEEDED FOR FLARES      Glycopyrrolate 1.5 MG tablet Take 0.5 tablets by mouth 2 (Two) Times a Day As Needed (sweating). 30 tablet 0    hydrOXYzine (ATARAX) 10 MG tablet Take 1 tablet by mouth 3 (Three) Times a Day As Needed (anxiety and/or sleep). 90  "tablet 0    ketoconazole (NIZORAL) 2 % cream       Levonorgestrel (MIRENA) 20 MCG/DAY intrauterine device IUD 1 each by Intrauterine route Every 8 (Eight) Years.      QUEtiapine (SEROquel) 25 MG tablet Take 2 tablets by mouth Every Night. 180 tablet 1    triamcinolone (KENALOG) 0.5 % cream 1 Application Every 12 (Twelve) Hours.      valACYclovir (VALTREX) 1000 MG tablet Take 1 tablet by mouth Daily. 30 tablet 11    [DISCONTINUED] Retin-A 0.025 % cream  (Patient not taking: Reported on 3/11/2025)       No current facility-administered medications on file prior to visit.       Allergies   Allergen Reactions    Penicillins Hives    Penicillin G Rash        Visit Vitals  /66   Pulse 90   Temp 98.7 °F (37.1 °C) (Infrared)   Ht 165.1 cm (65\")   Wt 88.6 kg (195 lb 6.4 oz)   SpO2 99%   BMI 32.52 kg/m²        Physical Exam  Constitutional:       General: She is not in acute distress.     Appearance: She is well-developed. She is not diaphoretic.   HENT:      Head: Atraumatic.   Cardiovascular:      Rate and Rhythm: Normal rate and regular rhythm.      Heart sounds: Normal heart sounds. No murmur heard.     No friction rub. No gallop.   Pulmonary:      Effort: Pulmonary effort is normal. No respiratory distress.      Breath sounds: Normal breath sounds. No stridor. No wheezing, rhonchi or rales.   Abdominal:      General: Bowel sounds are normal. There is no distension.      Palpations: Abdomen is soft. There is no mass.      Tenderness: There is no abdominal tenderness. There is no guarding or rebound.      Hernia: No hernia is present.   Musculoskeletal:      Cervical back: Normal range of motion and neck supple.   Skin:     General: Skin is warm and dry.   Neurological:      Mental Status: She is alert and oriented to person, place, and time.   Psychiatric:         Behavior: Behavior normal.          Results for orders placed or performed in visit on 07/03/24   C-reactive Protein    Collection Time: 07/03/24 10:02 AM "    Specimen: Blood   Result Value Ref Range    C-Reactive Protein <0.30 0.00 - 0.50 mg/dL   Celiac Comprehensive Panel    Collection Time: 07/03/24 10:02 AM    Specimen: Blood   Result Value Ref Range    Gliadin Deamidated Peptide Ab, IgA 3 0 - 19 units    Deaminated Gliadin Ab IgG 2 0 - 19 units    Tissue Transglutaminase IgA <2 0 - 3 U/mL    Tissue Transglutaminase IgG <2 0 - 5 U/mL    Endomysial IgA Negative Negative    IgA 95 87 - 352 mg/dL   CBC Auto Differential    Collection Time: 07/03/24 10:02 AM    Specimen: Blood   Result Value Ref Range    WBC 5.92 3.40 - 10.80 10*3/mm3    RBC 4.55 3.77 - 5.28 10*6/mm3    Hemoglobin 13.8 12.0 - 15.9 g/dL    Hematocrit 41.0 34.0 - 46.6 %    MCV 90.1 79.0 - 97.0 fL    MCH 30.3 26.6 - 33.0 pg    MCHC 33.7 31.5 - 35.7 g/dL    RDW 12.2 (L) 12.3 - 15.4 %    RDW-SD 39.7 37.0 - 54.0 fl    MPV 10.0 6.0 - 12.0 fL    Platelets 316 140 - 450 10*3/mm3    Neutrophil % 61.5 42.7 - 76.0 %    Lymphocyte % 29.7 19.6 - 45.3 %    Monocyte % 5.9 5.0 - 12.0 %    Eosinophil % 2.0 0.3 - 6.2 %    Basophil % 0.7 0.0 - 1.5 %    Immature Grans % 0.2 0.0 - 0.5 %    Neutrophils, Absolute 3.64 1.70 - 7.00 10*3/mm3    Lymphocytes, Absolute 1.76 0.70 - 3.10 10*3/mm3    Monocytes, Absolute 0.35 0.10 - 0.90 10*3/mm3    Eosinophils, Absolute 0.12 0.00 - 0.40 10*3/mm3    Basophils, Absolute 0.04 0.00 - 0.20 10*3/mm3    Immature Grans, Absolute 0.01 0.00 - 0.05 10*3/mm3    nRBC 0.0 0.0 - 0.2 /100 WBC        Immunization History   Administered Date(s) Administered    COVID-19 (PFIZER) Purple Cap Monovalent 05/02/2021, 05/23/2021    DTP 04/09/1994, 07/26/1994, 11/01/1994, 07/17/1995    HiB 07/26/1994    MMR 07/26/1994    Polio, Unspecified 04/09/1994, 07/26/1994, 11/01/1994    Tdap 12/21/2021       Problems Addressed this Visit          Endocrine and Metabolic    Vitamin D deficiency    Relevant Orders    Vitamin D,25-Hydroxy    Obesity (BMI 30.0-34.9)    Patient's (Body mass index is 32.52 kg/m².) indicates  that they are obese (BMI >30) with health conditions that include none . Weight is worsening. BMI  is above average; BMI management plan is completed. We discussed low calorie, low carb based diet program, portion control, and increasing exercise.          Relevant Orders    Comprehensive Metabolic Panel    TSH Rfx On Abnormal To Free T4    CBC & Differential    Lipid Panel    Hemoglobin A1c       Health Encounters    Well adult exam - Primary    The patient is here for health maintenance visit.  Currently, the patient consumes a unhealthy diet and has an inadequate exercise regimen.  Screening lab work is ordered.  Immunizations were reviewed today.  Advice and education was given regarding nutrition, aerobic exercise, routine dental evaluations, routine eye exams, reproductive health, cardiovascular risk reduction, sunscreen use, self skin examination (annual dermatology evaluations) and seatbelt use (general overall safety).  Further recommendations will be given if needed after lab evaluation.  Annual wellness evaluation is recommended.           Relevant Orders    Comprehensive Metabolic Panel    TSH Rfx On Abnormal To Free T4    CBC & Differential    Lipid Panel    Hemoglobin A1c    Vitamin D,25-Hydroxy     Other Visit Diagnoses         Well woman exam with routine gynecological exam        Relevant Orders    Ambulatory Referral to Obstetrics / Gynecology          Diagnoses         Codes Comments      Well adult exam    -  Primary ICD-10-CM: Z00.00  ICD-9-CM: V70.0       Vitamin D deficiency     ICD-10-CM: E55.9  ICD-9-CM: 268.9       Obesity (BMI 30.0-34.9)     ICD-10-CM: E66.811  ICD-9-CM: 278.00       Well woman exam with routine gynecological exam     ICD-10-CM: Z01.419  ICD-9-CM: V72.31              Return in about 1 year (around 3/11/2026) for Annual.    Pepito Michel MD  3/11/2025

## 2025-03-14 ENCOUNTER — LAB (OUTPATIENT)
Dept: LAB | Facility: HOSPITAL | Age: 35
End: 2025-03-14
Payer: COMMERCIAL

## 2025-03-14 DIAGNOSIS — E55.9 VITAMIN D DEFICIENCY: ICD-10-CM

## 2025-03-14 DIAGNOSIS — E66.811 OBESITY (BMI 30.0-34.9): ICD-10-CM

## 2025-03-14 DIAGNOSIS — Z00.00 WELL ADULT EXAM: ICD-10-CM

## 2025-03-14 LAB
25(OH)D3 SERPL-MCNC: 20.3 NG/ML (ref 30–100)
ALBUMIN SERPL-MCNC: 4.3 G/DL (ref 3.5–5.2)
ALBUMIN/GLOB SERPL: 1.7 G/DL
ALP SERPL-CCNC: 51 U/L (ref 39–117)
ALT SERPL W P-5'-P-CCNC: 30 U/L (ref 1–33)
ANION GAP SERPL CALCULATED.3IONS-SCNC: 8.3 MMOL/L (ref 5–15)
AST SERPL-CCNC: 24 U/L (ref 1–32)
BASOPHILS # BLD AUTO: 0.04 10*3/MM3 (ref 0–0.2)
BASOPHILS NFR BLD AUTO: 0.8 % (ref 0–1.5)
BILIRUB SERPL-MCNC: 0.3 MG/DL (ref 0–1.2)
BUN SERPL-MCNC: 11 MG/DL (ref 6–20)
BUN/CREAT SERPL: 9.9 (ref 7–25)
CALCIUM SPEC-SCNC: 9.4 MG/DL (ref 8.6–10.5)
CHLORIDE SERPL-SCNC: 103 MMOL/L (ref 98–107)
CHOLEST SERPL-MCNC: 178 MG/DL (ref 0–200)
CO2 SERPL-SCNC: 26.7 MMOL/L (ref 22–29)
CREAT SERPL-MCNC: 1.11 MG/DL (ref 0.57–1)
DEPRECATED RDW RBC AUTO: 40.8 FL (ref 37–54)
EGFRCR SERPLBLD CKD-EPI 2021: 67 ML/MIN/1.73
EOSINOPHIL # BLD AUTO: 0.1 10*3/MM3 (ref 0–0.4)
EOSINOPHIL NFR BLD AUTO: 1.9 % (ref 0.3–6.2)
ERYTHROCYTE [DISTWIDTH] IN BLOOD BY AUTOMATED COUNT: 12.1 % (ref 12.3–15.4)
GLOBULIN UR ELPH-MCNC: 2.6 GM/DL
GLUCOSE SERPL-MCNC: 91 MG/DL (ref 65–99)
HBA1C MFR BLD: 5.1 % (ref 4.8–5.6)
HCT VFR BLD AUTO: 42.5 % (ref 34–46.6)
HDLC SERPL-MCNC: 44 MG/DL (ref 40–60)
HGB BLD-MCNC: 13.6 G/DL (ref 12–15.9)
IMM GRANULOCYTES # BLD AUTO: 0.01 10*3/MM3 (ref 0–0.05)
IMM GRANULOCYTES NFR BLD AUTO: 0.2 % (ref 0–0.5)
LDLC SERPL CALC-MCNC: 120 MG/DL (ref 0–100)
LDLC/HDLC SERPL: 2.7 {RATIO}
LYMPHOCYTES # BLD AUTO: 1.95 10*3/MM3 (ref 0.7–3.1)
LYMPHOCYTES NFR BLD AUTO: 36.9 % (ref 19.6–45.3)
MCH RBC QN AUTO: 29.4 PG (ref 26.6–33)
MCHC RBC AUTO-ENTMCNC: 32 G/DL (ref 31.5–35.7)
MCV RBC AUTO: 92 FL (ref 79–97)
MONOCYTES # BLD AUTO: 0.45 10*3/MM3 (ref 0.1–0.9)
MONOCYTES NFR BLD AUTO: 8.5 % (ref 5–12)
NEUTROPHILS NFR BLD AUTO: 2.73 10*3/MM3 (ref 1.7–7)
NEUTROPHILS NFR BLD AUTO: 51.7 % (ref 42.7–76)
NRBC BLD AUTO-RTO: 0 /100 WBC (ref 0–0.2)
PLATELET # BLD AUTO: 362 10*3/MM3 (ref 140–450)
PMV BLD AUTO: 10 FL (ref 6–12)
POTASSIUM SERPL-SCNC: 4.3 MMOL/L (ref 3.5–5.2)
PROT SERPL-MCNC: 6.9 G/DL (ref 6–8.5)
RBC # BLD AUTO: 4.62 10*6/MM3 (ref 3.77–5.28)
SODIUM SERPL-SCNC: 138 MMOL/L (ref 136–145)
TRIGL SERPL-MCNC: 75 MG/DL (ref 0–150)
TSH SERPL DL<=0.05 MIU/L-ACNC: 0.89 UIU/ML (ref 0.27–4.2)
VLDLC SERPL-MCNC: 14 MG/DL (ref 5–40)
WBC NRBC COR # BLD AUTO: 5.28 10*3/MM3 (ref 3.4–10.8)

## 2025-03-14 PROCEDURE — 80061 LIPID PANEL: CPT

## 2025-03-14 PROCEDURE — 82306 VITAMIN D 25 HYDROXY: CPT

## 2025-03-14 PROCEDURE — 80050 GENERAL HEALTH PANEL: CPT

## 2025-03-14 PROCEDURE — 83036 HEMOGLOBIN GLYCOSYLATED A1C: CPT

## 2025-03-20 ENCOUNTER — TELEPHONE (OUTPATIENT)
Dept: FAMILY MEDICINE CLINIC | Facility: CLINIC | Age: 35
End: 2025-03-20

## 2025-03-20 NOTE — TELEPHONE ENCOUNTER
Caller: Nahomi Salcido    Relationship: Self    Best call back number:      [phone] (Mobile)     Who are you requesting to speak with (clinical staff, provider,  specific staff member): CLINICAL     What was the call regarding: PATIENT SAID SHE SENT A MESSAGE TO DR CMKAY THROUGH MY CHART ON 3-11-25 AND 3-17-25 AND HASN'T HEARD BACK FROM ANYONE ABOUT HER WEIGHT LOSS MEDICATION     PLEASE CALL

## 2025-04-21 ENCOUNTER — RESULTS FOLLOW-UP (OUTPATIENT)
Dept: LAB | Facility: HOSPITAL | Age: 35
End: 2025-04-21
Payer: COMMERCIAL

## 2025-04-21 DIAGNOSIS — R79.89 ELEVATED SERUM CREATININE: Primary | ICD-10-CM

## 2025-04-22 NOTE — TELEPHONE ENCOUNTER
Hub and front can read    Tried to reach the pt in regards to a message from the provider about lab results.  Dr. Michel:  Please let the patient know that labs that were drawn at previous visit were stable except for vitamin D which was low at 20.3, and creatinine which mildly elevated 1.11. Creatinine is a measure of kidney function and can be elevated for many different reasons including dehydration, medication side effects, strenuous exercise, infection, and others. I would recommend the patient avoid NSAID medications such as naproxen (Aleve), meloxicam, diclofenac, etodolac, and ibuprofen (Advil/Motrin). Patient should work on drinking 8 to 12 cups of water daily. I would like the patient to return sometime this week to recheck creatinine level. I will place the order and they can come by at their convenience to have the lab drawn. They do not need an appointment for this. If creatinine continues to elevate we will investigate the cause further. Patient should increase vitamin D3 supplementation to 2000 units daily. Patient should otherwise continue current treatment plan. If patient has any questions they can contact me.

## 2025-08-19 DIAGNOSIS — G47.9 SLEEP DISTURBANCE: ICD-10-CM

## 2025-08-19 DIAGNOSIS — F33.41 RECURRENT MAJOR DEPRESSIVE DISORDER, IN PARTIAL REMISSION: ICD-10-CM

## 2025-08-19 RX ORDER — QUETIAPINE FUMARATE 25 MG/1
50 TABLET, FILM COATED ORAL NIGHTLY
Qty: 180 TABLET | Refills: 1 | Status: SHIPPED | OUTPATIENT
Start: 2025-08-19